# Patient Record
Sex: FEMALE | Race: WHITE | HISPANIC OR LATINO | Employment: PART TIME | URBAN - NONMETROPOLITAN AREA
[De-identification: names, ages, dates, MRNs, and addresses within clinical notes are randomized per-mention and may not be internally consistent; named-entity substitution may affect disease eponyms.]

---

## 2019-07-02 ENCOUNTER — OFFICE VISIT (OUTPATIENT)
Dept: FAMILY MEDICINE CLINIC | Facility: CLINIC | Age: 19
End: 2019-07-02

## 2019-07-02 VITALS
SYSTOLIC BLOOD PRESSURE: 110 MMHG | TEMPERATURE: 99.1 F | HEART RATE: 81 BPM | WEIGHT: 125.2 LBS | BODY MASS INDEX: 19.65 KG/M2 | DIASTOLIC BLOOD PRESSURE: 70 MMHG | HEIGHT: 67 IN | OXYGEN SATURATION: 99 %

## 2019-07-02 DIAGNOSIS — H53.9 VISION DISTURBANCE: ICD-10-CM

## 2019-07-02 DIAGNOSIS — R07.9 CHEST PAIN, UNSPECIFIED TYPE: Primary | ICD-10-CM

## 2019-07-02 DIAGNOSIS — R00.2 PALPITATIONS: ICD-10-CM

## 2019-07-02 DIAGNOSIS — R11.0 NAUSEA: ICD-10-CM

## 2019-07-02 DIAGNOSIS — F41.8 DEPRESSION WITH ANXIETY: ICD-10-CM

## 2019-07-02 DIAGNOSIS — R06.02 SHORTNESS OF BREATH: ICD-10-CM

## 2019-07-02 DIAGNOSIS — R42 DIZZINESS: ICD-10-CM

## 2019-07-02 DIAGNOSIS — R53.82 CHRONIC FATIGUE: ICD-10-CM

## 2019-07-02 LAB
B-HCG UR QL: NEGATIVE
BILIRUB BLD-MCNC: NEGATIVE MG/DL
CLARITY, POC: CLEAR
COLOR UR: YELLOW
GLUCOSE UR STRIP-MCNC: NEGATIVE MG/DL
INTERNAL NEGATIVE CONTROL: NEGATIVE
INTERNAL POSITIVE CONTROL: POSITIVE
KETONES UR QL: ABNORMAL
LEUKOCYTE EST, POC: NEGATIVE
Lab: NORMAL
NITRITE UR-MCNC: NEGATIVE MG/ML
PH UR: 5.5 [PH] (ref 5–8)
PROT UR STRIP-MCNC: NEGATIVE MG/DL
RBC # UR STRIP: NEGATIVE /UL
SP GR UR: 1.01 (ref 1–1.03)
UROBILINOGEN UR QL: NORMAL

## 2019-07-02 PROCEDURE — 81003 URINALYSIS AUTO W/O SCOPE: CPT | Performed by: PHYSICIAN ASSISTANT

## 2019-07-02 PROCEDURE — 99204 OFFICE O/P NEW MOD 45 MIN: CPT | Performed by: PHYSICIAN ASSISTANT

## 2019-07-02 PROCEDURE — 81025 URINE PREGNANCY TEST: CPT | Performed by: PHYSICIAN ASSISTANT

## 2019-07-02 RX ORDER — RANITIDINE 150 MG/1
150 CAPSULE ORAL 2 TIMES DAILY
Qty: 60 CAPSULE | Refills: 2 | Status: SHIPPED | OUTPATIENT
Start: 2019-07-02 | End: 2019-09-20

## 2019-07-02 RX ORDER — IBUPROFEN 200 MG
200 TABLET ORAL EVERY 6 HOURS PRN
COMMUNITY
End: 2019-09-20

## 2019-07-02 RX ORDER — CALCIUM CARBONATE 200(500)MG
1 TABLET,CHEWABLE ORAL DAILY
COMMUNITY
End: 2019-09-20

## 2019-07-02 NOTE — PROGRESS NOTES
Subjective   Madelyn Grant is a 19 y.o. female here today to establish care     History of Present Illness     No chronic medical problems or daily medications.     Full term infant. No  concerns. Recurrent AOM at age 2 and had PE TUbes. No other surgeries or hospitalizations.     Couple weeks ago, was trying to go to sleep. Got hot, nauseated, CP, dizzy, palpitations, increased shakingand sweating. Wasn't sure if it was anxiety. Went to [facility]. They thought could have gastritis and anxiety. Was not anxious before and no big events or stressors now. After ER, they gave medication for nausea and CP. In 2019- had a similar episode with getting hot and nauseated and dizzy. Thought vomiting would help her feel better and forced vomiting and that made feel worse. Went to urgent care and they thought she was dehydrated. No other similar episodes. Does have episodes of heart palpitations. Sometimes is random and lying in bed. Heart racing with using e cigarette. Hasn't paid attention to what she was doing. Sometimes if head is below heart, can feel heart palpitations. Sometimes at night, has SOA. Had CP in the past but not as often any longer. States she has had increased SOA more often recently. Not associated with activity. Recently started working out again. Also notices with relaxing as well.     Smoked cigarettes for a few months then started using ECigarettes. Trying to stop now. Using less.     Wanted to check on acid reflux. Friend has Mono and she is concern that she could have Mono- reports has had increased fatigue for a few months.     Also wants to see if has heart issues with symptoms.     Has significant insomnia and has been trying to be more active to tire out and go to sleep- only works sometimes.     Thinks previously has trouble with anxiety but usually during the school year. Usually no trouble out of school but has had a few panic attacks while in school. States she is in school at   [employer]. Has been feeling sad more and feels unmotivated a lot. Has lost interest in things that she previously liked and was passionate about and has noticed she stays in bed more. States she has concerns for depression. Mother with depression.    Has bumps on her head- states she has bumps on her head. In high school, she had 1 bump but was not concerned about it. Only occasional headaches. Now has more \"bumps\". After going to urgent care in 2/2019, starting noticing things. Was too scared to deal with it. Sometimes, has headaches. Has pain in occipital area that hurts. Also areas in her head that are sore and tenderness. Has also been seeing differently- things are spotting or unclear. Night vision has decreased significantly. Sensitivity to light. When looks at the jaziel when is bright, sees stars. Has also been feeling light-headed, dizzy, and weak. Over the past couple days, feels fuzzy in the back of her head. If any ETOH, was for a day or so then resolve but has been occurring more often and with no ETOH intake. More dizzy than normal. Ringing in bilateral ears as well that is worsening. No numbness, tingling, weakness in arms or legs, speech abnormalities or confusion. Has trouble walking with dizziness.     Drinks 1 cup of coffee daily- if works, drinks 2 cups daily. Caffeine free tea. No other caffeine. Water- drinks about 1+ liters daily. Eating sporadic. Trying to do intermittent fasting. States she eats about 2 meals daily, not eating in the morning. Eats at lunch or afternoon and then will snack and dinner has another meal. If eats again later, it is a snack. Snacks- cashews, slice of cheese, or strawberries. Not chips, fast food, or junk food. Occasionally will eat tortilla chips or seldom rosanna chips.     The following portions of the patient's history were reviewed and updated as appropriate: allergies, current medications, past family history, past medical history, past social history, past surgical " history and problem list.    Review of Systems   All other systems reviewed and are negative.      Objective   Physical Exam   Constitutional: She is oriented to person, place, and time. She appears well-developed and well-nourished. No distress.   HENT:   Head: Normocephalic and atraumatic.   Right Ear: Hearing, tympanic membrane, external ear and ear canal normal.   Left Ear: Hearing, tympanic membrane, external ear and ear canal normal.   Nose: Nose normal.   Mouth/Throat: Oropharynx is clear and moist.   Eyes: Conjunctivae, EOM and lids are normal. Pupils are equal, round, and reactive to light.   Neck: Neck supple. No JVD present. Carotid bruit is not present. No tracheal deviation present. No thyroid mass and no thyromegaly present.   Cardiovascular: Normal rate, regular rhythm, normal heart sounds and intact distal pulses. Exam reveals no gallop and no friction rub.   No murmur heard.  Pulses:       Radial pulses are 2+ on the right side, and 2+ on the left side.        Posterior tibial pulses are 2+ on the right side, and 2+ on the left side.   Pulmonary/Chest: Effort normal and breath sounds normal. No respiratory distress. She has no wheezes. She has no rhonchi. She has no rales.   Abdominal: Soft. Normal aorta and bowel sounds are normal. She exhibits no distension and no abdominal bruit. There is no hepatosplenomegaly. There is no tenderness. There is no rigidity, no rebound and no guarding. No hernia.   Musculoskeletal: Normal range of motion. She exhibits no edema, tenderness or deformity.   Normal strength.   Lymphadenopathy:     She has no cervical adenopathy.   Neurological: She is alert and oriented to person, place, and time. She has normal strength and normal reflexes. She displays normal reflexes. No cranial nerve deficit or sensory deficit. She exhibits normal muscle tone. Coordination and gait normal.   Skin: Skin is warm and dry. No rash noted. She is not diaphoretic. No erythema.    Psychiatric: She has a normal mood and affect. Her speech is normal and behavior is normal. Judgment and thought content normal. Cognition and memory are normal.       Assessment/Plan   Madelyn was seen today for establish care.    Diagnoses and all orders for this visit:    Chest pain, unspecified type  -     MRI internal auditory canal w wo  -     Ambulatory Referral to Cardiology  -     CBC & Differential  -     Comprehensive Metabolic Panel  -     Lipid Panel With LDL / HDL Ratio  -     Thyroid Panel With TSH  -     Iron and TIBC  -     Ferritin  -     Vitamin B12 & Folate  -     POC Urinalysis Dipstick, Automated  -     POC Pregnancy, Urine  -     TSH  -     Magnesium  -     ranitidine (ZANTAC) 150 MG capsule; Take 1 capsule by mouth 2 (Two) Times a Day.  -     Cheyenne-Barr Virus VCA Antibody Panel    Shortness of breath  -     MRI internal auditory canal w wo  -     Ambulatory Referral to Cardiology  -     CBC & Differential  -     Comprehensive Metabolic Panel  -     Lipid Panel With LDL / HDL Ratio  -     Thyroid Panel With TSH  -     Iron and TIBC  -     Ferritin  -     Vitamin B12 & Folate  -     POC Urinalysis Dipstick, Automated  -     POC Pregnancy, Urine  -     TSH  -     Magnesium  -     ranitidine (ZANTAC) 150 MG capsule; Take 1 capsule by mouth 2 (Two) Times a Day.  -     Cheyenne-Barr Virus VCA Antibody Panel    Palpitations  -     MRI internal auditory canal w wo  -     Ambulatory Referral to Cardiology  -     CBC & Differential  -     Comprehensive Metabolic Panel  -     Lipid Panel With LDL / HDL Ratio  -     Thyroid Panel With TSH  -     Iron and TIBC  -     Ferritin  -     Vitamin B12 & Folate  -     POC Urinalysis Dipstick, Automated  -     POC Pregnancy, Urine  -     TSH  -     Magnesium  -     ranitidine (ZANTAC) 150 MG capsule; Take 1 capsule by mouth 2 (Two) Times a Day.  -     Cheyenne-Barr Virus VCA Antibody Panel    Dizziness  -     MRI internal auditory canal w wo  -     Ambulatory  Referral to Cardiology  -     CBC & Differential  -     Comprehensive Metabolic Panel  -     Lipid Panel With LDL / HDL Ratio  -     Thyroid Panel With TSH  -     Iron and TIBC  -     Ferritin  -     Vitamin B12 & Folate  -     POC Urinalysis Dipstick, Automated  -     POC Pregnancy, Urine  -     TSH  -     Magnesium  -     ranitidine (ZANTAC) 150 MG capsule; Take 1 capsule by mouth 2 (Two) Times a Day.  -     Cheyenne-Barr Virus VCA Antibody Panel    Vision disturbance  -     MRI internal auditory canal w wo  -     Ambulatory Referral to Cardiology  -     CBC & Differential  -     Comprehensive Metabolic Panel  -     Lipid Panel With LDL / HDL Ratio  -     Thyroid Panel With TSH  -     Iron and TIBC  -     Ferritin  -     Vitamin B12 & Folate  -     POC Urinalysis Dipstick, Automated  -     POC Pregnancy, Urine  -     TSH  -     Magnesium  -     ranitidine (ZANTAC) 150 MG capsule; Take 1 capsule by mouth 2 (Two) Times a Day.  -     Cheeyn[place]-Barr Virus VCA Antibody Panel    Nausea  -     MRI internal auditory canal w wo  -     Ambulatory Referral to Cardiology  -     CBC & Differential  -     Comprehensive Metabolic Panel  -     Lipid Panel With LDL / HDL Ratio  -     Thyroid Panel With TSH  -     Iron and TIBC  -     Ferritin  -     Vitamin B12 & Folate  -     POC Urinalysis Dipstick, Automated  -     POC Pregnancy, Urine  -     TSH  -     Magnesium  -     ranitidine (ZANTAC) 150 MG capsule; Take 1 capsule by mouth 2 (Two) Times a Day.  -     Cheyenne-Barr Virus VCA Antibody Panel    Chronic fatigue  -     Cheyenne-Barr Virus VCA Antibody Panel    Depression with anxiety      Patient Instructions   19-year-old female who presents today as a new patient.  She has had several concerning issues going on for months that she would like to address.      She reports 2/1/2019 she had an episode with getting hot and nauseated and dizzy.  She thought vomiting would help her feel better and forced vomiting.  This made her feel  worse, so she went to urgent care.  They thought she was dehydrated.  Then a couple weeks ago, she was trying to go to sleep and got hot, nauseated, CP, dizzy, palpitations, and noted increased shaking and sweating.  She wasn't sure if it was anxiety and went to [facility]. They thought she could have gastritis and anxiety.  She reports she was not anxious before this episode and no big events or stressors prior to episode.  At the ER, they gave medication for nausea and CP.  She has not taken medication.  Patient does have episodes of heart palpitations. Sometimes palpitations are random and sometimes occur when she is lying in bed.  She experiences heart racing with using e cigarette.  Sometimes if her head is below heart, she can feel heart palpitations. Sometimes at night, she has SOA. She has not kept a diary or noticed any other associations with symptoms.Had CP in the past but not as often any longer. States she has had increased SOA more often recently. Not associated with exertion. Recently started working out again.  Patient has a history of smoking cigarettes for a few months then started using ECigarettes.  She is using less now trying to stop..     Patient is also concerned that some of her symptoms could be related to reflux.  She has not tried any medications for this or treatment.    She also has a friend who has had Mono recently and she is concern that she could have Mono, reporting increased fatigue for a few months.     Patient thinks the previously has had trouble with anxiety but it usually occurs during the school year, having a few panic attacks during school.  She usually has no trouble outside of school. States she is in school at [employer] studying exercise science with her and goal of becoming a physical therapist.  She has also been feeling sad more and feels unmotivated a lot.  Patient has lost interest in things that she previously liked and was passionate about and has noticed she  "stays in bed more.  She states she has concerns for depression and family history positive for her mother with depression.  She also has significant insomnia and has been trying to be more active to tire herself out and go to sleep-stating this only works sometimes.     She also has bumps on her head. In high school, she had 1 bump, but she was not concerned about it.  She only has occasional headaches. Now she has more \"bumps\". After going to urgent care in 2/2019, she starting noticing other symptoms.  Patient reports she was too scared to deal with it. Sometimes, she has headaches.  Patient reports she has pain in the occipital area.  There are areas in her head that are sore and tender at times as well.  Patient also reports vision disturbance with seeing things are spotty or unclear.  Her night vision has decreased significantly as well and she has noticed sensitivity to light. When she looks at the jaziel when it is bright, she sees stars. Has also been feeling light-headed, dizzy, and weak. Over the past couple days, she feels fuzzy in the back of her head. If she has any ETOH, would experience symptoms for a day or so then resolve but has been occurring more often and with no ETOH intake. More dizzy than normal.  Patient also has ringing in bilateral ears as well that is worsening. No numbness, tingling, weakness in arms or legs, speech abnormalities or confusion.  She does have trouble walking with the dizziness.     Patient drinks 1 cup of coffee daily.  If she works, she drinks 2 cups daily.  Other beverages include caffeine free tea and water- drinks about 1+ liters daily. No other caffeine intake.  Her eating is sporadic.  She is trying to do intermittent fasting. States she eats about 2 meals daily, not eating in the morning.  She eats at lunch or afternoon and then will snack and dinner has another meal. If the eats again later, it is a snack.  Her snacks-consist of cashews, slice of cheese, or " strawberries.  She does not eat chips, fast food, or junk food. Occasionally will eat tortilla chips or seldom rosanna chips.     I discussed with her the symptoms she is having in detail and counseled on possible etiology.  I will refer for MRI brain and internal auditory canal as well as will perform fasting labs ASAP.  I will have her start Zantac 150 mg twice daily.  I will also refer to cardiology for evaluation.  If MRI is normal and no findings on labs, we will start medication for anxiety to see if this helps with symptoms.  To be seen here or ER ASAP if worsening, new or changing symptoms.

## 2019-07-03 ENCOUNTER — OFFICE VISIT (OUTPATIENT)
Dept: FAMILY MEDICINE CLINIC | Facility: CLINIC | Age: 19
End: 2019-07-03

## 2019-07-03 VITALS
DIASTOLIC BLOOD PRESSURE: 62 MMHG | WEIGHT: 125.3 LBS | SYSTOLIC BLOOD PRESSURE: 100 MMHG | BODY MASS INDEX: 19.67 KG/M2 | HEIGHT: 67 IN | TEMPERATURE: 98.8 F | OXYGEN SATURATION: 99 % | HEART RATE: 76 BPM

## 2019-07-03 DIAGNOSIS — R53.1 WEAKNESS: ICD-10-CM

## 2019-07-03 DIAGNOSIS — R42 DIZZINESS: ICD-10-CM

## 2019-07-03 DIAGNOSIS — R00.2 PALPITATIONS: ICD-10-CM

## 2019-07-03 DIAGNOSIS — Z11.8 ENCOUNTER FOR SCREENING EXAMINATION FOR CHLAMYDIAL INFECTION: ICD-10-CM

## 2019-07-03 DIAGNOSIS — Z00.00 ROUTINE ADULT HEALTH MAINTENANCE: Primary | ICD-10-CM

## 2019-07-03 LAB — GLUCOSE BLDC GLUCOMTR-MCNC: 117 MG/DL (ref 70–130)

## 2019-07-03 PROCEDURE — 99214 OFFICE O/P EST MOD 30 MIN: CPT | Performed by: PHYSICIAN ASSISTANT

## 2019-07-03 PROCEDURE — 99395 PREV VISIT EST AGE 18-39: CPT | Performed by: PHYSICIAN ASSISTANT

## 2019-07-03 PROCEDURE — 82962 GLUCOSE BLOOD TEST: CPT | Performed by: PHYSICIAN ASSISTANT

## 2019-07-03 NOTE — PROGRESS NOTES
France Grant is a 19 y.o. female here today for annual exam     History of Present Illness     No previous PAP.   Last TDAP- going into 6th grade.     The following portions of the patient's history were reviewed and updated as appropriate: allergies, current medications, past family history, past medical history, past social history, past surgical history and problem list.    Review of Systems   All other systems reviewed and are negative.      Objective   Physical Exam   Constitutional: She is oriented to person, place, and time. She appears well-developed and well-nourished. No distress.   HENT:   Head: Normocephalic and atraumatic.   Right Ear: Hearing, tympanic membrane, external ear and ear canal normal.   Left Ear: Hearing, tympanic membrane, external ear and ear canal normal.   Nose: Nose normal.   Mouth/Throat: Oropharynx is clear and moist.   Eyes: Conjunctivae, EOM and lids are normal. Pupils are equal, round, and reactive to light.   Neck: Neck supple. No JVD present. Carotid bruit is not present. No tracheal deviation present. No thyroid mass and no thyromegaly present.   Cardiovascular: Normal rate, regular rhythm, normal heart sounds and intact distal pulses. Exam reveals no gallop and no friction rub.   No murmur heard.  Pulses:       Radial pulses are 2+ on the right side, and 2+ on the left side.        Posterior tibial pulses are 2+ on the right side, and 2+ on the left side.   Pulmonary/Chest: Effort normal and breath sounds normal. No respiratory distress. She has no wheezes. She has no rhonchi. She has no rales.   Abdominal: Soft. Normal aorta and bowel sounds are normal. She exhibits no distension and no abdominal bruit. There is no hepatosplenomegaly. There is no tenderness. There is no rigidity, no rebound and no guarding. No hernia.   Musculoskeletal: Normal range of motion. She exhibits no edema, tenderness or deformity.   Normal strength.   Lymphadenopathy:     She has no  cervical adenopathy.   Neurological: She is alert and oriented to person, place, and time. She has normal strength and normal reflexes. She displays normal reflexes. No cranial nerve deficit or sensory deficit. She exhibits normal muscle tone. Coordination and gait normal.   Skin: Skin is warm and dry. No rash noted. She is not diaphoretic. No erythema.   Psychiatric: She has a normal mood and affect. Her speech is normal and behavior is normal. Judgment and thought content normal. Cognition and memory are normal.       Assessment/Plan   Madelyn was seen today for annual exam.    Diagnoses and all orders for this visit:    Routine adult health maintenance    Encounter for screening examination for chlamydial infection  -     Chlamydia trachomatis, Neisseria gonorrhoeae, Trichomonas vaginalis, PCR - Swab, Urine, Clean Catch    Dizziness  -     POCT Glucose  -     ECG 12 Lead    Palpitations    Weakness      Patient Instructions   19 year old female who presents today for CPE. CPE performed. She was seen yesterday as a new patient with numerous symptoms, including palpitations, SOA, CP, dizziness, near syncope, and weakness. I ordered fasting labs and referred to cardiology and neurology. She had labs this morning and symptoms recurred with dizziness, weakness, palpitations, SOA, CP. Patient ate and had something to drink and rested and symptoms were persistent. Glucose was ok and EKG borderline. I will have her see cardiology and neurology as referred. I will await labs. Patient to go to ER if no resolution of symptoms, worsening, new or changing symptoms. Her father came to pick her up today. I discussed recommendations and plan with both patient and father. They are in agreement with plan today.

## 2019-07-03 NOTE — PATIENT INSTRUCTIONS
19-year-old female who presents today as a new patient.  She has had several concerning issues going on for months that she would like to address.      She reports 2/1/2019 she had an episode with getting hot and nauseated and dizzy.  She thought vomiting would help her feel better and forced vomiting.  This made her feel worse, so she went to urgent care.  They thought she was dehydrated.  Then a couple weeks ago, she was trying to go to sleep and got hot, nauseated, CP, dizzy, palpitations, and noted increased shaking and sweating.  She wasn't sure if it was anxiety and went to [facility]. They thought she could have gastritis and anxiety.  She reports she was not anxious before this episode and no big events or stressors prior to episode.  At the ER, they gave medication for nausea and CP.  She has not taken medication.  Patient does have episodes of heart palpitations. Sometimes palpitations are random and sometimes occur when she is lying in bed.  She experiences heart racing with using e cigarette.  Sometimes if her head is below heart, she can feel heart palpitations. Sometimes at night, she has SOA. She has not kept a diary or noticed any other associations with symptoms.Had CP in the past but not as often any longer. States she has had increased SOA more often recently. Not associated with exertion. Recently started working out again.  Patient has a history of smoking cigarettes for a few months then started using ECigarettes.  She is using less now trying to stop..     Patient is also concerned that some of her symptoms could be related to reflux.  She has not tried any medications for this or treatment.    She also has a friend who has had Mono recently and she is concern that she could have Mono, reporting increased fatigue for a few months.     Patient thinks the previously has had trouble with anxiety but it usually occurs during the school year, having a few panic attacks during school.  She usually  "has no trouble outside of school. States she is in school at [employer] studying exercise science with her and goal of becoming a physical therapist.  She has also been feeling sad more and feels unmotivated a lot.  Patient has lost interest in things that she previously liked and was passionate about and has noticed she stays in bed more.  She states she has concerns for depression and family history positive for her mother with depression.  She also has significant insomnia and has been trying to be more active to tire herself out and go to sleep-stating this only works sometimes.     She also has bumps on her head. In high school, she had 1 bump, but she was not concerned about it.  She only has occasional headaches. Now she has more \"bumps\". After going to urgent care in 2/2019, she starting noticing other symptoms.  Patient reports she was too scared to deal with it. Sometimes, she has headaches.  Patient reports she has pain in the occipital area.  There are areas in her head that are sore and tender at times as well.  Patient also reports vision disturbance with seeing things are spotty or unclear.  Her night vision has decreased significantly as well and she has noticed sensitivity to light. When she looks at the jaizel when it is bright, she sees stars. Has also been feeling light-headed, dizzy, and weak. Over the past couple days, she feels fuzzy in the back of her head. If she has any ETOH, would experience symptoms for a day or so then resolve but has been occurring more often and with no ETOH intake. More dizzy than normal.  Patient also has ringing in bilateral ears as well that is worsening. No numbness, tingling, weakness in arms or legs, speech abnormalities or confusion.  She does have trouble walking with the dizziness.     Patient drinks 1 cup of coffee daily.  If she works, she drinks 2 cups daily.  Other beverages include caffeine free tea and water- drinks about 1+ liters daily. No other caffeine " intake.  Her eating is sporadic.  She is trying to do intermittent fasting. States she eats about 2 meals daily, not eating in the morning.  She eats at lunch or afternoon and then will snack and dinner has another meal. If the eats again later, it is a snack.  Her snacks-consist of cashews, slice of cheese, or strawberries.  She does not eat chips, fast food, or junk food. Occasionally will eat tortilla chips or seldom rosanna chips.     I discussed with her the symptoms she is having in detail and counseled on possible etiology.  I will refer for MRI brain and internal auditory canal as well as will perform fasting labs ASAP.  I will have her start Zantac 150 mg twice daily.  I will also refer to cardiology for evaluation.  If MRI is normal and no findings on labs, we will start medication for anxiety to see if this helps with symptoms.  To be seen here or ER ASAP if worsening, new or changing symptoms.

## 2019-07-04 LAB
ALBUMIN SERPL-MCNC: 5.3 G/DL (ref 3.5–5.2)
ALBUMIN/GLOB SERPL: 2.2 G/DL
ALP SERPL-CCNC: 60 U/L (ref 39–117)
ALT SERPL-CCNC: 6 U/L (ref 1–33)
AST SERPL-CCNC: 13 U/L (ref 1–32)
BASOPHILS # BLD AUTO: 0.05 10*3/MM3 (ref 0–0.2)
BASOPHILS NFR BLD AUTO: 0.6 % (ref 0–1.5)
BILIRUB SERPL-MCNC: 0.5 MG/DL (ref 0.2–1.2)
BUN SERPL-MCNC: 12 MG/DL (ref 6–20)
BUN/CREAT SERPL: 14.3 (ref 7–25)
CALCIUM SERPL-MCNC: 9.7 MG/DL (ref 8.6–10.5)
CHLORIDE SERPL-SCNC: 100 MMOL/L (ref 98–107)
CHOLEST SERPL-MCNC: 165 MG/DL (ref 0–200)
CO2 SERPL-SCNC: 25 MMOL/L (ref 22–29)
CREAT SERPL-MCNC: 0.84 MG/DL (ref 0.57–1)
EOSINOPHIL # BLD AUTO: 0.08 10*3/MM3 (ref 0–0.4)
EOSINOPHIL NFR BLD AUTO: 1 % (ref 0.3–6.2)
ERYTHROCYTE [DISTWIDTH] IN BLOOD BY AUTOMATED COUNT: 13.6 % (ref 12.3–15.4)
FERRITIN SERPL-MCNC: 39.9 NG/ML (ref 13–150)
FOLATE SERPL-MCNC: 8.26 NG/ML (ref 4.78–24.2)
FT4I SERPL CALC-MCNC: 2.4 (ref 1.2–4.9)
GLOBULIN SER CALC-MCNC: 2.4 GM/DL
GLUCOSE SERPL-MCNC: 86 MG/DL (ref 65–99)
HCT VFR BLD AUTO: 44.9 % (ref 34–46.6)
HDLC SERPL-MCNC: 77 MG/DL (ref 40–60)
HGB BLD-MCNC: 13.6 G/DL (ref 12–15.9)
IMM GRANULOCYTES # BLD AUTO: 0.02 10*3/MM3 (ref 0–0.05)
IMM GRANULOCYTES NFR BLD AUTO: 0.3 % (ref 0–0.5)
IRON SATN MFR SERPL: 26 % (ref 20–50)
IRON SERPL-MCNC: 111 MCG/DL (ref 37–145)
LDLC SERPL CALC-MCNC: 74 MG/DL (ref 0–100)
LDLC/HDLC SERPL: 0.96 {RATIO}
LYMPHOCYTES # BLD AUTO: 2.5 10*3/MM3 (ref 0.7–3.1)
LYMPHOCYTES NFR BLD AUTO: 31.5 % (ref 19.6–45.3)
MAGNESIUM SERPL-MCNC: 2.1 MG/DL (ref 1.7–2.2)
MCH RBC QN AUTO: 26 PG (ref 26.6–33)
MCHC RBC AUTO-ENTMCNC: 30.3 G/DL (ref 31.5–35.7)
MCV RBC AUTO: 85.7 FL (ref 79–97)
MONOCYTES # BLD AUTO: 0.65 10*3/MM3 (ref 0.1–0.9)
MONOCYTES NFR BLD AUTO: 8.2 % (ref 5–12)
NEUTROPHILS # BLD AUTO: 4.63 10*3/MM3 (ref 1.7–7)
NEUTROPHILS NFR BLD AUTO: 58.4 % (ref 42.7–76)
NRBC BLD AUTO-RTO: 0 /100 WBC (ref 0–0.2)
PLATELET # BLD AUTO: 208 10*3/MM3 (ref 140–450)
POTASSIUM SERPL-SCNC: 4.2 MMOL/L (ref 3.5–5.2)
PROT SERPL-MCNC: 7.7 G/DL (ref 6–8.5)
RBC # BLD AUTO: 5.24 10*6/MM3 (ref 3.77–5.28)
SODIUM SERPL-SCNC: 139 MMOL/L (ref 136–145)
T3RU NFR SERPL: 30 % (ref 24–39)
T4 SERPL-MCNC: 8 UG/DL (ref 4.5–12)
TIBC SERPL-MCNC: 426 MCG/DL
TRIGL SERPL-MCNC: 69 MG/DL (ref 0–150)
TSH SERPL DL<=0.005 MIU/L-ACNC: 2.53 UIU/ML (ref 0.45–4.5)
UIBC SERPL-MCNC: 315 MCG/DL (ref 112–346)
VIT B12 SERPL-MCNC: 438 PG/ML (ref 211–946)
VLDLC SERPL CALC-MCNC: 13.8 MG/DL
WBC # BLD AUTO: 7.93 10*3/MM3 (ref 3.4–10.8)

## 2019-07-05 LAB
C TRACH RRNA SPEC QL NAA+PROBE: NEGATIVE
EBV EA IGG SER-ACNC: 26.3 U/ML (ref 0–8.9)
EBV NA IGG SER IA-ACNC: 389 U/ML (ref 0–17.9)
EBV VCA IGG SER IA-ACNC: 113 U/ML (ref 0–17.9)
EBV VCA IGM SER IA-ACNC: <36 U/ML (ref 0–35.9)
N GONORRHOEA RRNA SPEC QL NAA+PROBE: NEGATIVE
SERVICE CMNT-IMP: ABNORMAL
T VAGINALIS RRNA VAG QL NAA+PROBE: NEGATIVE

## 2019-07-07 DIAGNOSIS — B27.90: Primary | ICD-10-CM

## 2019-07-08 NOTE — PROGRESS NOTES
France Grant is a 19 y.o. female here with dizziness, weakness.     History of Present Illness     {Common H&P Review Areas:08433}    Review of Systems   Cardiovascular: Positive for palpitations.   Neurological: Positive for dizziness, weakness and light-headedness.   All other systems reviewed and are negative.      Objective   Physical Exam   Constitutional: She is oriented to person, place, and time. She appears well-developed and well-nourished. No distress.   HENT:   Head: Normocephalic and atraumatic.   Right Ear: Hearing, tympanic membrane, external ear and ear canal normal.   Left Ear: Hearing, tympanic membrane, external ear and ear canal normal.   Nose: Nose normal.   Mouth/Throat: Oropharynx is clear and moist.   Eyes: Conjunctivae, EOM and lids are normal. Pupils are equal, round, and reactive to light.   Neck: Neck supple. No JVD present. Carotid bruit is not present. No tracheal deviation present. No thyroid mass and no thyromegaly present.   Cardiovascular: Normal rate, regular rhythm, normal heart sounds and intact distal pulses. Exam reveals no gallop and no friction rub.   No murmur heard.  Pulses:       Radial pulses are 2+ on the right side, and 2+ on the left side.        Posterior tibial pulses are 2+ on the right side, and 2+ on the left side.   Pulmonary/Chest: Effort normal and breath sounds normal. No respiratory distress. She has no wheezes. She has no rhonchi. She has no rales.   Abdominal: Soft. Normal aorta and bowel sounds are normal. She exhibits no distension and no abdominal bruit. There is no hepatosplenomegaly. There is no tenderness. There is no rigidity, no rebound and no guarding. No hernia.   Musculoskeletal: Normal range of motion. She exhibits no edema, tenderness or deformity.   Normal strength.   Lymphadenopathy:     She has no cervical adenopathy.   Neurological: She is alert and oriented to person, place, and time. She has normal strength and normal  reflexes. She displays normal reflexes. No cranial nerve deficit or sensory deficit. She exhibits normal muscle tone. Coordination and gait normal.   Skin: Skin is warm and dry. No rash noted. She is not diaphoretic. No erythema.   Psychiatric: She has a normal mood and affect. Her speech is normal and behavior is normal. Judgment and thought content normal. Cognition and memory are normal.   Nursing note and vitals reviewed.    ECG 12 Lead  Date/Time: 7/8/2019 7:57 AM  Performed by: Mansi Davalos PA  Authorized by: Mansi Davalos PA   Comparison: not compared with previous ECG   Previous ECG: no previous ECG available  Rhythm: sinus rhythm  Rate: normal  ST Elevation: I, II, V2, V4, V5 and V6  ST Depression: III  T Waves: T waves normal  QRS axis: normal    Clinical impression: non-specific ECG  Comments: Indication: dizziness, palpititations            Assessment/Plan   {Assess/PlanSmartLinks:19673}

## 2019-07-15 NOTE — PROGRESS NOTES
France Grant is a 19 y.o. female here for dizziness, presyncope, heart racing, and feeling poorly. .     History of Present Illness     Patient developed dizziness and feeling like she could pass out as well as heart racing and SOA with having her blood drawn. She has eaten and had something to drink and reports symptoms are not resolving.     The following portions of the patient's history were reviewed and updated as appropriate: allergies, current medications, past family history, past medical history, past social history, past surgical history and problem list.    Review of Systems   Constitutional: Positive for fatigue.   Respiratory: Positive for shortness of breath.    Cardiovascular: Positive for palpitations.   Neurological: Positive for dizziness and weakness.   All other systems reviewed and are negative.      Objective   Physical Exam   Constitutional: She is oriented to person, place, and time. She appears well-developed and well-nourished. No distress.   HENT:   Head: Normocephalic and atraumatic.   Right Ear: Hearing, tympanic membrane, external ear and ear canal normal.   Left Ear: Hearing, tympanic membrane, external ear and ear canal normal.   Nose: Nose normal.   Mouth/Throat: Oropharynx is clear and moist.   Eyes: Conjunctivae, EOM and lids are normal. Pupils are equal, round, and reactive to light.   Neck: Neck supple. No JVD present. Carotid bruit is not present. No tracheal deviation present. No thyroid mass and no thyromegaly present.   Cardiovascular: Normal rate, regular rhythm, normal heart sounds and intact distal pulses. Exam reveals no gallop and no friction rub.   No murmur heard.  Pulses:       Radial pulses are 2+ on the right side, and 2+ on the left side.        Posterior tibial pulses are 2+ on the right side, and 2+ on the left side.   Pulmonary/Chest: Effort normal and breath sounds normal. No respiratory distress. She has no wheezes. She has no rhonchi. She has no  rales.   Abdominal: Soft. Normal aorta and bowel sounds are normal. She exhibits no distension and no abdominal bruit. There is no hepatosplenomegaly. There is no tenderness. There is no rigidity, no rebound and no guarding. No hernia.   Musculoskeletal: Normal range of motion. She exhibits no edema, tenderness or deformity.   Normal strength.   Lymphadenopathy:     She has no cervical adenopathy.   Neurological: She is alert and oriented to person, place, and time. She has normal strength and normal reflexes. She displays normal reflexes. No cranial nerve deficit or sensory deficit. She exhibits normal muscle tone. Coordination and gait normal.   Skin: Skin is warm and dry. No rash noted. She is not diaphoretic. No erythema.   Psychiatric: She has a normal mood and affect. Her speech is normal and behavior is normal. Judgment and thought content normal. Cognition and memory are normal.       Assessment/Plan   Madelyn was seen today for annual exam.    Diagnoses and all orders for this visit:    Routine adult health maintenance    Encounter for screening examination for chlamydial infection  -     Chlamydia trachomatis, Neisseria gonorrhoeae, Trichomonas vaginalis, PCR - Swab, Urine, Clean Catch    Dizziness  -     POCT Glucose  -     ECG 12 Lead    Palpitations    Weakness      Patient Instructions   19 year old female who presents today for CPE. CPE performed. She was seen yesterday as a new patient with numerous symptoms, including palpitations, SOA, CP, dizziness, near syncope, and weakness. I ordered fasting labs and referred to cardiology and neurology. She had labs this morning and symptoms recurred with dizziness, weakness, palpitations, SOA, CP. Patient ate and had something to drink and rested and symptoms were persistent. Glucose was ok and EKG borderline. I will have her see cardiology and neurology as referred. I will await labs. Patient to go to ER if no resolution of symptoms, worsening, new or  changing symptoms. Her father came to pick her up today. I discussed recommendations and plan with both patient and father. They are in agreement with plan today.

## 2019-07-15 NOTE — PROGRESS NOTES
Subjective   Madelyn Grant is a 19 y.o. female.   Chief Complaint   Patient presents with   • Annual Exam       History of Present Illness    No previous PAP.     {Common H&P Review Areas:05029}    Review of Systems    Objective   Physical Exam    Vitals:    07/03/19 0825   BP: 100/62   Pulse: 76   Temp: 98.8 °F (37.1 °C)   SpO2: 99%       Assessment/Plan   {Assess/PlanSmartLinks:22394}           Madelyn Grant IS A 19 y.o. female WHO PRESENTS TODAY IN FOLLOW UP OF    Diagnosis Plan   1. Routine adult health maintenance     2. Encounter for screening examination for chlamydial infection  Chlamydia trachomatis, Neisseria gonorrhoeae, Trichomonas vaginalis, PCR - Swab, Urine, Clean Catch   3. Dizziness  POCT Glucose    ECG 12 Lead   4. Palpitations     5. Weakness     .

## 2019-07-15 NOTE — PATIENT INSTRUCTIONS
19 year old female who presents today for CPE. CPE performed. She was seen yesterday as a new patient with numerous symptoms, including palpitations, SOA, CP, dizziness, near syncope, and weakness. I ordered fasting labs and referred to cardiology and neurology. She had labs this morning and symptoms recurred with dizziness, weakness, palpitations, SOA, CP. Patient ate and had something to drink and rested and symptoms were persistent. Glucose was ok and EKG borderline. I will have her see cardiology and neurology as referred. I will await labs. Patient to go to ER if no resolution of symptoms, worsening, new or changing symptoms. Her father came to pick her up today. I discussed recommendations and plan with both patient and father. They are in agreement with plan today.

## 2019-07-20 ENCOUNTER — TRANSCRIBE ORDERS (OUTPATIENT)
Dept: ADMINISTRATIVE | Facility: HOSPITAL | Age: 19
End: 2019-07-20

## 2019-07-20 ENCOUNTER — HOSPITAL ENCOUNTER (OUTPATIENT)
Dept: GENERAL RADIOLOGY | Facility: HOSPITAL | Age: 19
Discharge: HOME | End: 2019-07-20

## 2019-07-20 ENCOUNTER — HOSPITAL ENCOUNTER (OUTPATIENT)
Dept: MRI IMAGING | Facility: HOSPITAL | Age: 19
Discharge: HOME | End: 2019-07-20
Admitting: PHYSICIAN ASSISTANT

## 2019-07-20 DIAGNOSIS — T15.90XA FOREIGN BODY, EYE, UNSPECIFIED LATERALITY, INITIAL ENCOUNTER: ICD-10-CM

## 2019-07-20 DIAGNOSIS — T15.90XA FOREIGN BODY, EYE, UNSPECIFIED LATERALITY, INITIAL ENCOUNTER: Primary | ICD-10-CM

## 2019-07-20 PROCEDURE — 70030 X-RAY EYE FOR FOREIGN BODY: CPT

## 2019-07-20 PROCEDURE — 70553 MRI BRAIN STEM W/O & W/DYE: CPT

## 2019-07-20 PROCEDURE — 82565 ASSAY OF CREATININE: CPT

## 2019-07-20 PROCEDURE — A9577 INJ MULTIHANCE: HCPCS | Performed by: PHYSICIAN ASSISTANT

## 2019-07-20 PROCEDURE — 0 GADOBENATE DIMEGLUMINE 529 MG/ML SOLUTION: Performed by: PHYSICIAN ASSISTANT

## 2019-07-20 RX ADMIN — GADOBENATE DIMEGLUMINE 10 ML: 529 INJECTION, SOLUTION INTRAVENOUS at 16:15

## 2019-07-22 LAB — CREAT BLDA-MCNC: 0.9 MG/DL (ref 0.6–1.3)

## 2019-07-29 DIAGNOSIS — R11.0 NAUSEA: ICD-10-CM

## 2019-07-29 DIAGNOSIS — H53.9 VISION DISTURBANCE: ICD-10-CM

## 2019-07-29 DIAGNOSIS — R42 DIZZINESS: Primary | ICD-10-CM

## 2019-07-29 DIAGNOSIS — R53.1 WEAKNESS: ICD-10-CM

## 2019-07-29 DIAGNOSIS — R53.82 CHRONIC FATIGUE: ICD-10-CM

## 2019-07-29 DIAGNOSIS — R90.89 ABNORMAL BRAIN MRI: ICD-10-CM

## 2019-09-20 ENCOUNTER — OFFICE VISIT (OUTPATIENT)
Dept: NEUROLOGY | Facility: CLINIC | Age: 19
End: 2019-09-20

## 2019-09-20 VITALS
WEIGHT: 122 LBS | DIASTOLIC BLOOD PRESSURE: 70 MMHG | HEIGHT: 67 IN | BODY MASS INDEX: 19.15 KG/M2 | HEART RATE: 57 BPM | SYSTOLIC BLOOD PRESSURE: 124 MMHG | OXYGEN SATURATION: 96 %

## 2019-09-20 DIAGNOSIS — R42 DIZZINESS: Primary | ICD-10-CM

## 2019-09-20 PROCEDURE — 99204 OFFICE O/P NEW MOD 45 MIN: CPT | Performed by: PSYCHIATRY & NEUROLOGY

## 2019-09-20 NOTE — PROGRESS NOTES
"Madelyn Grant is a 19 y.o. female presents for   Chief Complaint   Patient presents with   • Dizziness                CHIEF COMPLAINT:  Neurology consultation from ALEKSANDR Hooper for dizziness  History of Present Illness  A 19 TAE, right-handed, comes un-accompanied: her mom is in the waiting area maggy she prefers her mom not to  join us.  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  Dizziness onset February 2019. Prior she would have the occasional \"dizziness\"  Characteristics: light-headedness. In past she would feel room spinning with nausea  but nothing lately in terms of vertigo.  Triggers/factors: single most important factor is drinking the day before: only drinks on weekends, four liquor drinks/ day.  However, in prior times she was drinking quite hfeavily \"almost every day\":   Last significant dizziness was in June when she actually had vertigo with nausea and feeling generally weak  No focal neurological symptoms when she is sober : only one time she was drunk and had transient diplopia  Associated neurological occasional random sporadic non-persistent non-localized headaches. She has pressure forehead headaches when weather is cloudy    Art the time of her vertigo she had a brain MRI in July 2019: The MRI  showed an area of focal area of gliosis measuring 18 x 8 mm with encephalomalacia extending in the left frontal periventricular        I have completed ehr ROS, PH, SH, FH, medications and allergiesThe following portions of the patient's history were reviewed and updated as appropriate: allergies, current medications, past family history, past medical history, past social history, past surgical history and problem list.        Review of Systems   Constitutional: Positive for activity change, appetite change, chills, diaphoresis and fatigue. Negative for fever.   HENT: Positive for congestion. Negative for trouble swallowing.    Eyes: Positive for photophobia and visual disturbance. Negative for pain. "   Respiratory: Positive for cough, chest tightness and shortness of breath.    Cardiovascular: Positive for chest pain and palpitations. Negative for leg swelling.   Gastrointestinal: Positive for nausea. Negative for abdominal pain and vomiting.   Endocrine: Positive for cold intolerance and heat intolerance. Negative for polydipsia.   Genitourinary: Negative for decreased urine volume, difficulty urinating and urgency.   Musculoskeletal: Negative for back pain, neck pain and neck stiffness.   Skin: Negative for color change, rash and wound.   Allergic/Immunologic: Positive for environmental allergies. Negative for food allergies and immunocompromised state.   Neurological: Positive for dizziness, weakness, light-headedness and headaches. Negative for tremors, seizures, syncope, facial asymmetry, speech difficulty and numbness.   Hematological: Does not bruise/bleed easily.   Psychiatric/Behavioral: Positive for agitation, decreased concentration, sleep disturbance and suicidal ideas. Negative for behavioral problems, confusion, dysphoric mood, hallucinations and self-injury. The patient is nervous/anxious. The patient is not hyperactive.          Past Medical History:   Diagnosis Date   • Chest pain    • Chronic fatigue    • Depression with anxiety    • Dizziness    • Nausea    • Palpitations    • Shortness of breath    • Vision disturbance          Social History     Socioeconomic History   • Marital status: Single     Spouse name: Not on file   • Number of children: Not on file   • Years of education: Not on file   • Highest education level: Not on file   Occupational History   • Occupation: Physical Therapy Technician    Tobacco Use   • Smoking status: Former Smoker     Types: Electronic Cigarette   • Smokeless tobacco: Never Used   Substance and Sexual Activity   • Alcohol use: Yes     Comment: 5 drinks per week    • Drug use: No   • Sexual activity: Yes     Partners: Male          Family History   Problem  Relation Age of Onset   • Hypertension Father    • Cancer Mother         breast   • Depression Mother    • Diabetes Maternal Grandmother    • Diabetes Maternal Grandfather          No current outpatient medications on file.      There were no vitals filed for this visit.      Physical Exam   Constitutional: She is oriented to person, place, and time. She appears well-developed and well-nourished. No distress.   Eyes:   Fundoscopic exam OD reveals normal disc margins normal venous pulsations, no exudates and normal blood vessels   Cardiovascular: Normal rate, regular rhythm and intact distal pulses. Exam reveals no gallop and no friction rub.   No murmur heard.  Normal carotid pulses and no bruits   Neurological: She is oriented to person, place, and time. She has normal strength. She has a normal Finger-Nose-Finger Test, a normal Heel to Shin Test, a normal Romberg Test and a normal Tandem Gait Test. Gait normal.   Reflex Scores:       Tricep reflexes are 2+ on the right side and 2+ on the left side.       Bicep reflexes are 2+ on the right side and 2+ on the left side.       Brachioradialis reflexes are 2+ on the right side and 2+ on the left side.       Patellar reflexes are 2+ on the right side and 2+ on the left side.       Achilles reflexes are 2+ on the right side and 2+ on the left side.  Skin: She is not diaphoretic.   Psychiatric: She has a normal mood and affect. Her speech is normal and behavior is normal. Judgment and thought content normal.   Nursing note and vitals reviewed.        Neurologic Exam     Mental Status   Oriented to person, place, and time.   Registration: recalls 3 of 3 objects. Recall at 5 minutes: recalls 3 of 3 objects. Follows 3 step commands.   Attention: normal. Concentration: normal.   Speech: speech is normal   Level of consciousness: alert  Knowledge: good. Able to perform simple calculations.   Able to name object. Able to read. Able to repeat. Able to write. Normal  comprehension.     Cranial Nerves   Cranial nerves II through XII intact.     Motor Exam   Muscle bulk: normal  Overall muscle tone: normal  Right arm tone: normal  Left arm tone: normal  Right arm pronator drift: absent  Left arm pronator drift: absent  Right leg tone: normal  Left leg tone: normal    Strength   Strength 5/5 throughout.     Sensory Exam   Light touch normal.   Vibration normal.   Proprioception normal.   Pinprick normal.   Graphesthesia: normal  Stereognosis: normal    Gait, Coordination, and Reflexes     Gait  Gait: normal    Coordination   Romberg: negative  Finger to nose coordination: normal  Heel to shin coordination: normal  Tandem walking coordination: normal    Tremor   Resting tremor: absent  Intention tremor: absent  Action tremor: absent    Reflexes   Reflexes 2+ except as noted.   Right brachioradialis: 2+  Left brachioradialis: 2+  Right biceps: 2+  Left biceps: 2+  Right triceps: 2+  Left triceps: 2+  Right patellar: 2+  Left patellar: 2+  Right achilles: 2+  Left achilles: 2+  Right : 2+  Left : 2+  Right Marshall: absent  Left Marshall: absent  Right ankle clonus: absent  Left ankle clonus: absent  Right pendular knee jerk: absent  Left pendular knee jerk: absent          No visits with results within 2 Month(s) from this visit.   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    DIAGNOSIS, IMPRESSION AND PLAN:  1. Dizziness with unrelated abnormality on the [facility]ha MRI  Her dizziness has had historic correlation to alcohol intake  Her neurological exam is normal  PLAN:  1. Reassured about the MRI  abnormality but cannot be sure about date: alcohol intake quite heavy in times past and she might have ahd head injury. Timing of the cause of the lesion is actually old by MRI criteria  Education about deleterious effects of alcohol an d other related behaviors: defer to her PCP (I called and spoke with ALEKSANDR Hooper).  There is no current  indicated need to do follow up brain MRI for the lesion.